# Patient Record
Sex: FEMALE | Race: WHITE
[De-identification: names, ages, dates, MRNs, and addresses within clinical notes are randomized per-mention and may not be internally consistent; named-entity substitution may affect disease eponyms.]

---

## 2018-02-23 ENCOUNTER — HOSPITAL ENCOUNTER (OUTPATIENT)
Dept: HOSPITAL 31 - C.ER | Age: 43
Discharge: HOME | End: 2018-02-23
Attending: OBSTETRICS & GYNECOLOGY
Payer: COMMERCIAL

## 2018-02-23 VITALS
SYSTOLIC BLOOD PRESSURE: 90 MMHG | RESPIRATION RATE: 18 BRPM | HEART RATE: 60 BPM | DIASTOLIC BLOOD PRESSURE: 60 MMHG | TEMPERATURE: 98 F

## 2018-02-23 VITALS — OXYGEN SATURATION: 100 %

## 2018-02-23 DIAGNOSIS — N83.8: ICD-10-CM

## 2018-02-23 DIAGNOSIS — N83.291: Primary | ICD-10-CM

## 2018-02-23 LAB
ALBUMIN SERPL-MCNC: 4.3 G/DL (ref 3.5–5)
ALBUMIN/GLOB SERPL: 1.4 {RATIO} (ref 1–2.1)
ALT SERPL-CCNC: 23 U/L (ref 9–52)
APTT BLD: 27 SECONDS (ref 21–34)
AST SERPL-CCNC: 30 U/L (ref 14–36)
BASOPHILS # BLD AUTO: 0 K/UL (ref 0–0.2)
BASOPHILS NFR BLD: 0.4 % (ref 0–2)
BILIRUB UR-MCNC: NEGATIVE MG/DL
BUN SERPL-MCNC: 16 MG/DL (ref 7–17)
CALCIUM SERPL-MCNC: 9.4 MG/DL (ref 8.6–10.4)
EOSINOPHIL # BLD AUTO: 0.1 K/UL (ref 0–0.7)
EOSINOPHIL NFR BLD: 1.2 % (ref 0–4)
ERYTHROCYTE [DISTWIDTH] IN BLOOD BY AUTOMATED COUNT: 12.2 % (ref 11.5–14.5)
GFR NON-AFRICAN AMERICAN: > 60
GLUCOSE UR STRIP-MCNC: NORMAL MG/DL
HCG,QUALITATIVE URINE: NEGATIVE
HGB BLD-MCNC: 13.8 G/DL (ref 11–16)
INR PPP: 1
LEUKOCYTE ESTERASE UR-ACNC: (no result) LEU/UL
LIPASE: 110 U/L (ref 23–300)
LYMPHOCYTES # BLD AUTO: 2.4 K/UL (ref 1–4.3)
LYMPHOCYTES NFR BLD AUTO: 31.6 % (ref 20–40)
MCH RBC QN AUTO: 33.2 PG (ref 27–31)
MCHC RBC AUTO-ENTMCNC: 35.6 G/DL (ref 33–37)
MCV RBC AUTO: 93.3 FL (ref 81–99)
MONOCYTES # BLD: 0.5 K/UL (ref 0–0.8)
MONOCYTES NFR BLD: 6.2 % (ref 0–10)
NEUTROPHILS # BLD: 4.7 K/UL (ref 1.8–7)
NEUTROPHILS NFR BLD AUTO: 60.6 % (ref 50–75)
NRBC BLD AUTO-RTO: 0.2 % (ref 0–2)
PH UR STRIP: 6 [PH] (ref 5–8)
PLATELET # BLD: 252 K/UL (ref 130–400)
PMV BLD AUTO: 8.2 FL (ref 7.2–11.7)
PROT UR STRIP-MCNC: NEGATIVE MG/DL
PROTHROMBIN TIME: 10.6 SECONDS (ref 9.7–12.2)
RBC # BLD AUTO: 4.16 MIL/UL (ref 3.8–5.2)
RBC # UR STRIP: NEGATIVE /UL
SP GR UR STRIP: 1.03 (ref 1–1.03)
SQUAMOUS EPITHIAL: 2 /HPF (ref 0–5)
URINE NITRATE: NEGATIVE
UROBILINOGEN UR-MCNC: 4 MG/DL (ref 0.2–1)
WBC # BLD AUTO: 7.8 K/UL (ref 4.8–10.8)

## 2018-02-23 PROCEDURE — 85025 COMPLETE CBC W/AUTO DIFF WBC: CPT

## 2018-02-23 PROCEDURE — 81001 URINALYSIS AUTO W/SCOPE: CPT

## 2018-02-23 PROCEDURE — 83690 ASSAY OF LIPASE: CPT

## 2018-02-23 PROCEDURE — 96375 TX/PRO/DX INJ NEW DRUG ADDON: CPT

## 2018-02-23 PROCEDURE — 99285 EMERGENCY DEPT VISIT HI MDM: CPT

## 2018-02-23 PROCEDURE — 80053 COMPREHEN METABOLIC PANEL: CPT

## 2018-02-23 PROCEDURE — 58350 REOPEN FALLOPIAN TUBE: CPT

## 2018-02-23 PROCEDURE — 58120 DILATION AND CURETTAGE: CPT

## 2018-02-23 PROCEDURE — 88305 TISSUE EXAM BY PATHOLOGIST: CPT

## 2018-02-23 PROCEDURE — 84703 CHORIONIC GONADOTROPIN ASSAY: CPT

## 2018-02-23 PROCEDURE — 88104 CYTOPATH FL NONGYN SMEARS: CPT

## 2018-02-23 PROCEDURE — 96365 THER/PROPH/DIAG IV INF INIT: CPT

## 2018-02-23 PROCEDURE — 58662 LAPAROSCOPY EXCISE LESIONS: CPT

## 2018-02-23 PROCEDURE — 85730 THROMBOPLASTIN TIME PARTIAL: CPT

## 2018-02-23 PROCEDURE — 85610 PROTHROMBIN TIME: CPT

## 2018-02-23 PROCEDURE — 93005 ELECTROCARDIOGRAM TRACING: CPT

## 2018-02-23 RX ADMIN — HYDROMORPHONE HYDROCHLORIDE PRN MG: 1 INJECTION, SOLUTION INTRAMUSCULAR; INTRAVENOUS; SUBCUTANEOUS at 12:16

## 2018-02-23 RX ADMIN — HYDROMORPHONE HYDROCHLORIDE PRN MG: 1 INJECTION, SOLUTION INTRAMUSCULAR; INTRAVENOUS; SUBCUTANEOUS at 12:48

## 2018-02-23 NOTE — PCM.SURG1
Surgeon's Initial Post Op Note





- Surgeon's Notes


Surgeon: dr guo


Assistant: dr wilson


Type of Anesthesia: General LMA


Anesthesia Administered By: dr caraballo


Pre-Operative Diagnosis: 42 yr  with acute pelvic pain /right ovarian cyst


Operative Findings: see the op reort


Post-Operative Diagnosis: same


Operation Performed: laproscopic right ovarian cystectomy/laprscopic 

chromotabation


Specimen/Specimens Removed: right ovarian cyst wakk.  pelvic washing


Estimated Blood Loss: EBL {In ML}: 20


Blood Products Given: N/A


Drains Used: No Drains


Post-Op Condition: Good


Date of Surgery/Procedure: 18


Time of Surgery/Procedure: 11:00

## 2018-02-23 NOTE — C.PDOC
History Of Present Illness


pt presents with rlq , right pelvic pain worsening over the lst few days. No f/c

/n/v. sharp, stabbing pain.  Tolerating po


Time Seen by Provider: 02/23/18 06:37


Chief Complaint (Nursing): Abdominal Pain


History Per: Patient


History/Exam Limitations: no limitations


Onset/Duration Of Symptoms: Days


Current Symptoms Are (Timing): Still Present


Context: Other


Severity: Moderate


Pain Scale Rating Of: 4


Location Of Pain/Discomfort: RLQ, Suprapubic


Radiation Of Pain To:: None


Quality Of Discomfort: Sharp, Aching, Pressure


Associated Symptoms: denies: Fever, Chills, Nausea


Exacerbating Factors: Movement


Alleviating Factors: None


Last Bowel Movement: Yesterday


Recent travel outside of the [place]: No


Additional History Per: Patient


Abnormal Vaginal Bleeding: No





Past Medical History


Reviewed: Historical Data, Nursing Documentation, Vital Signs





- Medical History


PMH: 


   Denies: Chronic Kidney Disease


Surgical History: No Surg Hx





- [facility] Procedures








INJECT/INFUSE NEC (08/20/14)


SUTURE OF MOUTH LAC NEC (10/17/14)








Family History: States: No Known Family Hx





Review Of Systems


Constitutional: Negative for: Fever, Chills


ENT: Negative for: Throat Pain


Cardiovascular: Negative for: Chest Pain


Respiratory: Negative for: Shortness of Breath


Gastrointestinal: Positive for: Abdominal Pain (rlq).  Negative for: Nausea, 

Vomiting


Genitourinary: Negative for: Vaginal Bleeding


Musculoskeletal: Negative for: Back Pain


Skin: Negative for: Rash


Neurological: Negative for: Weakness


Psych: Negative for: Anxiety





Physical Exam





- Physical Exam


Appears: Non-toxic


Skin: Warm, Dry


Head: Normacephalic


Eye(s): bilateral: Normal Inspection


Oral Mucosa: Moist


Throat: No Erythema


Neck: Supple


Chest: Symmetrical


Cardiovascular: Rhythm Regular


Respiratory: No Rales, No Rhonchi


Gastrointestinal/Abdominal: Soft, Tenderness (rlq), No Guarding, No Rebound, 

Other (right suprapubic)


Extremity: Normal ROM


Extremity: Bilateral: Atraumatic


Neurological/Psych: Oriented x3


Gait: Steady





ED Course And Treatment


O2 Sat by Pulse Oximetry: 99


Pulse Ox Interpretation: Normal





Disposition


Discussed With : Hugh Baker


Comment: accepted the pt on her service and took over the care at 6:49AM


Doctor Will See Patient In The: Hospital


Counseled Patient/Family Regarding: Studies Performed, Diagnosis





- Disposition


Referrals: 


Hugh Baker MD [Primary Care Provider] - 


Disposition: HOSPITALIZED


Disposition Time: 06:37


Condition: FAIR


Forms:  [facility] (English)





- POA


Present On Arrival: None





- Clinical Impression


Clinical Impression: 


 Abdominal pain in female patient, Pelvic pain








Decision To Admit





- Pt Status Changed To:


Hospital Disposition Of: Inpatient





- Admit Certification


Admit to Inpatient:: After my assessment, the patient will require 

hospitalization for at least two midnights.  This is because of the severity of 

symptoms shown, intensity of services needed, and/or the medical risk in this 

patient being treated as an outpatient.





- InPatient:


Physician Admission Certification:: After my assessment, the patient will 

require hospitalization for at least two midnights.  This is because of the 

severity of symptoms shown, intensity of services needed, and/or the medical 

risk in this patient being treated as an outpatient.





- .


Bed Request Type: Regular


Admitting Physician: Hugh Baker


Patient Diagnosis: 


 Abdominal pain in female patient, Pelvic pain

## 2018-02-23 NOTE — CP.PCM.HP
History of Present Illness





- History of Present Illness


History of Present Illness: 





History and Physical for Dr. Baker





CC: Abdominal pain





HPI: 43 yo  F with PMH of ovarian cysts who presents complaining of 

right sided pelvic pain. Pain first started 3 weeks ago, intermittent. Pain 

started again yesterday afternoon and has been constant, 9-10/10 in severity 

since the onset, sharp and stabbing in character. Pain radiates to back and 

epigastrum. She tried OTC analgesics which did not help. No particular 

exacerbating factors. She denies fever, chills, chest pain, shortness of breath

, nausea, vomiting, vaginal bleeding, dysuria, hematuria.





Remainder of 12 point ROS was obtained and was negative except as above.





PMH: Ovarian cysts


PSH: Open appendectomy, right foot surgery


Home meds: None


Social history: Denies tobacco, alcohol, or illicits. Lives at home with her 

son and . Works as a phlebotomist


All: NKDA





OB Hx:


1994, , 40 weeks, boy, healthy, no complications in pregnancy








Present on Admission





- Present on Admission


Any Indicators Present on Admission: No





Past Patient History





- Infectious Disease


Hx of Infectious Diseases: None





- Past Social History


Smoking Status: Never Smoked





- CARDIAC


Hx Cardiac Disorders: No





- PULMONARY


Hx Respiratory Disorders: No





- NEUROLOGICAL


Hx Neurological Disorder: No





- HEENT


Hx HEENT Problems: No





- RENAL


Hx Chronic Kidney Disease: No





- ENDOCRINE/METABOLIC


Hx Endocrine Disorders: No





- GENITOURINARY/GYNECOLOGICAL


Hx Genitourinary Disorders: Yes


Other/Comment: RT OVARIAN CYST





- PSYCHIATRIC


Hx Substance Use: No





- SURGICAL HISTORY


Hx Appendectomy: Yes





- ANESTHESIA


Hx Anesthesia: Yes


Hx Anesthesia Reactions: No





Meds


Allergies/Adverse Reactions: 


 Allergies











Allergy/AdvReac Type Severity Reaction Status Date / Time


 


No Known Allergies Allergy   Verified 18 06:33














Physical Exam





- Constitutional


Appears: Non-toxic, In Acute Distress (mild)





- Head Exam


Head Exam: ATRAUMATIC, NORMOCEPHALIC





- Eye Exam


Eye Exam: EOMI, Normal appearance, PERRL





- ENT Exam


ENT Exam: Mucous Membranes Moist





- Neck Exam


Neck exam: Positive for: Normal Inspection





- Respiratory Exam


Respiratory Exam: Clear to Auscultation Bilateral, NORMAL BREATHING PATTERN





- Cardiovascular Exam


Cardiovascular Exam: RRR, +S1, +S2





- GI/Abdominal Exam


GI & Abdominal Exam: Normal Bowel Sounds, Soft, Tenderness (Right pelvic 

tenderness)





- Extremities Exam


Extremities exam: Positive for: normal inspection.  Negative for: calf 

tenderness, pedal edema





- Back Exam


Back exam: absent: CVA tenderness (L), CVA tenderness (R)





- Neurological Exam


Neurological exam: Alert, Oriented x3





- Psychiatric Exam


Psychiatric exam: Normal Affect, Normal Mood





- Skin


Skin Exam: Dry, Intact, Normal Color





Results





- Vital Signs


Recent Vital Signs: 





 Last Vital Signs











Temp  98.7 F   18 06:35


 


Pulse  82   18 06:35


 


Resp  18   18 06:35


 


BP  105/73   18 06:35


 


Pulse Ox  99   18 06:50














- Labs


Result Diagrams: 


 18 07:01





 18 07:01


Labs: 





 Laboratory Results - last 24 hr











  18





  06:46 07:01 07:01


 


WBC   7.8 


 


RBC   4.16 


 


Hgb   13.8 


 


Hct   38.8 


 


MCV   93.3 


 


MCH   33.2 H 


 


MCHC   35.6 


 


RDW   12.2 


 


Plt Count   252 


 


MPV   8.2 


 


Neut % (Auto)   60.6 


 


Lymph % (Auto)   31.6 


 


Mono % (Auto)   6.2 


 


Eos % (Auto)   1.2 


 


Baso % (Auto)   0.4 


 


Neut # (Auto)   4.7 


 


Lymph # (Auto)   2.4 


 


Mono # (Auto)   0.5 


 


Eos # (Auto)   0.1 


 


Baso # (Auto)   0.0 


 


PT    10.6


 


INR    1.0


 


APTT    27


 


Sodium   


 


Potassium   


 


Chloride   


 


Carbon Dioxide   


 


Anion Gap   


 


BUN   


 


Creatinine   


 


Est GFR ( Amer)   


 


Est GFR (Non-Af Amer)   


 


Random Glucose   


 


Calcium   


 


Total Bilirubin   


 


AST   


 


ALT   


 


Alkaline Phosphatase   


 


Total Protein   


 


Albumin   


 


Globulin   


 


Albumin/Globulin Ratio   


 


Lipase   


 


Urine Color  Yellow  


 


Urine Clarity  Clear  


 


Urine pH  6.0  


 


Ur Specific Gravity  1.026  


 


Urine Protein  Negative  


 


Urine Glucose (UA)  Normal  


 


Urine Ketones  Negative  


 


Urine Blood  Negative  


 


Urine Nitrate  Negative  


 


Urine Bilirubin  Negative  


 


Urine Urobilinogen  4.0 H  


 


Ur Leukocyte Esterase  Neg  


 


Urine WBC (Auto)  < 1  


 


Urine RBC (Auto)  1  


 


Ur Squamous Epith Cells  2  


 


Urine HCG, Qual  Negative  














  18





  07:01


 


WBC 


 


RBC 


 


Hgb 


 


Hct 


 


MCV 


 


MCH 


 


MCHC 


 


RDW 


 


Plt Count 


 


MPV 


 


Neut % (Auto) 


 


Lymph % (Auto) 


 


Mono % (Auto) 


 


Eos % (Auto) 


 


Baso % (Auto) 


 


Neut # (Auto) 


 


Lymph # (Auto) 


 


Mono # (Auto) 


 


Eos # (Auto) 


 


Baso # (Auto) 


 


PT 


 


INR 


 


APTT 


 


Sodium  137


 


Potassium  3.9


 


Chloride  100


 


Carbon Dioxide  26


 


Anion Gap  15


 


BUN  16


 


Creatinine  0.6 L


 


Est GFR ( Amer)  > 60


 


Est GFR (Non-Af Amer)  > 60


 


Random Glucose  96


 


Calcium  9.4


 


Total Bilirubin  0.5


 


AST  30


 


ALT  23


 


Alkaline Phosphatase  43


 


Total Protein  7.4


 


Albumin  4.3


 


Globulin  3.1


 


Albumin/Globulin Ratio  1.4


 


Lipase  110


 


Urine Color 


 


Urine Clarity 


 


Urine pH 


 


Ur Specific Gravity 


 


Urine Protein 


 


Urine Glucose (UA) 


 


Urine Ketones 


 


Urine Blood 


 


Urine Nitrate 


 


Urine Bilirubin 


 


Urine Urobilinogen 


 


Ur Leukocyte Esterase 


 


Urine WBC (Auto) 


 


Urine RBC (Auto) 


 


Ur Squamous Epith Cells 


 


Urine HCG, Qual 














Assessment & Plan





- Assessment and Plan (Free Text)


Assessment: 





43 yo  F with history of open appendectomy and ovarian cysts presents 

complaining of severe right pelvic pain. Possible ruptured ovarian cyst.





Plan


Admit to unit


Plan for exploratory laparoscopy; possible right ovarian cystectomy; possible 

right salpingo-oophorectomy


Type and screen


Pain control


NPO


Ordered Mefoxin 2gm IV once preoperatively


Continue IVF





Plan discussed with Dr. Baker

## 2018-02-27 NOTE — OP
PROCEDURE DATE:



PREOPERATIVE DIAGNOSES:  A 42 years old  1, para 1 comes in with

acute pelvic pain on the right side, she has a history of right ovarian

cyst.



POSTOPERATIVE DIAGNOSES:  A 42-year-old  1, para 1, comes in with

acute pelvic pain on the right side, she has a history of right ovarian

cyst and adhesions.



SURGEON:  Hugh Baker MD



ASSISTANT SURGEON:  Kevin Monge MD, who was present throughout the surgery

with retraction, pushing and helping with the laparoscopy.



COMPLICATIONS:  None.



PROCEDURE PERFORMED:  Right diagnostic laparoscopy, right ovarian

cystectomy, and _____ of the tubes.



ANESTHESIA:  General anesthesia.



ANESTHESIOLOGIST:   _____.



DESCRIPTION OF PROCEDURE:  After informed consent was obtained, the patient

was brought to the operating room, placed on the table where general

anesthesia was given.  Once the anesthesia was found to be adequate, she

was prepped and draped in the normal sterile fashion.  A Cool catheter was

inserted under sterile condition.  Uterus was examined.  It was found to be

relatively 6 weeks' old.  No pelvic or adnexal masses.  The anterior lip of

the cervix was grasped with a tenaculum and gentle dilatation of the cervix

was done.  Then the HUMI catheter was inserted for manipulation of the

uterus.  After that attention was turned towards the patient's abdomen.  At

the site of the umbilicus and the lower uterine segment, incision was made

with a knife _____ intraabdominal placement was confirmed with the gas and

the fascia at the umbilicus was tied with 2-0 Vicryl.  After that, two 5-mm

ports were placed on the left and the right side.  After that it looked

like the peritoneum was _____ the tube on the left side was very adherent,

a cyst on the right side was there and some blood in there which may be

_____.  After that, the decision was to do the right ovarian cystectomy. 

It looks like a simple cyst after that.  The ovary was held with a _____ on

one side and cystectomy was performed on the other side using a LigaSure. 

The fluid of the pelvic washings was sent before that and the cyst was

removed using the LigaSure, two were sent to Pathology.  After that, the

site of the ovary which was bleeding, we used a LigaSure to stop the

bleeding after that.  Because the tube on the left side was very adherent

with _____ the left side where the tube was blocked and the right side was

open after that.  Lot of irrigation was done and found to be hemostatic and

the FloSeal was used, it was hemostatic, no bleeding and then all the ports

were removed, the gas was removed.  The fascia at the site of the umbilicus

was closed using 2-0 Vicryl, skin was closed with 4-0 Monocryl.  HUMI was

removed, catheter was removed.  The patient was sent home on Percocet and

Motrin.  Follow up with Dr. Baker in 10 days.







__________________________________________

Hugh Baker MD





DD:  2018 8:13:21

DT:  2018 10:52:37

Job # [id number]